# Patient Record
(demographics unavailable — no encounter records)

---

## 2025-02-03 NOTE — PHYSICAL EXAM
[No Acute Distress] : no acute distress [Well Nourished] : well nourished [Well Developed] : well developed [Well-Appearing] : well-appearing [Normal Sclera/Conjunctiva] : normal sclera/conjunctiva [PERRL] : pupils equal round and reactive to light [EOMI] : extraocular movements intact [Normal Outer Ear/Nose] : the outer ears and nose were normal in appearance [Normal Oropharynx] : the oropharynx was normal [No JVD] : no jugular venous distention [No Lymphadenopathy] : no lymphadenopathy [Thyroid Normal, No Nodules] : the thyroid was normal and there were no nodules present [Supple] : supple [No Respiratory Distress] : no respiratory distress  [No Accessory Muscle Use] : no accessory muscle use [Clear to Auscultation] : lungs were clear to auscultation bilaterally [Normal Rate] : normal rate  [Regular Rhythm] : with a regular rhythm [Normal S1, S2] : normal S1 and S2 [No Murmur] : no murmur heard [No Abdominal Bruit] : a ~M bruit was not heard ~T in the abdomen [No Carotid Bruits] : no carotid bruits [No Varicosities] : no varicosities [Pedal Pulses Present] : the pedal pulses are present [No Edema] : there was no peripheral edema [No Palpable Aorta] : no palpable aorta [No Extremity Clubbing/Cyanosis] : no extremity clubbing/cyanosis [Soft] : abdomen soft [Non Tender] : non-tender [No Masses] : no abdominal mass palpated [Non-distended] : non-distended [No HSM] : no HSM [Normal Bowel Sounds] : normal bowel sounds [Normal Posterior Cervical Nodes] : no posterior cervical lymphadenopathy [Normal Anterior Cervical Nodes] : no anterior cervical lymphadenopathy [No CVA Tenderness] : no CVA  tenderness [No Spinal Tenderness] : no spinal tenderness [No Joint Swelling] : no joint swelling [Grossly Normal Strength/Tone] : grossly normal strength/tone [No Rash] : no rash [Coordination Grossly Intact] : coordination grossly intact [No Focal Deficits] : no focal deficits [Normal Gait] : normal gait [Deep Tendon Reflexes (DTR)] : deep tendon reflexes were 2+ and symmetric [Normal Affect] : the affect was normal [Normal Insight/Judgement] : insight and judgment were intact

## 2025-02-26 NOTE — HISTORY OF PRESENT ILLNESS
[FreeTextEntry1] : URI [de-identified] : Patient presents today for further evaluation of cough and sore throat for the past 2 days. Patient reports mild fever. States their symptoms have been getting worse despite the use of OTC meds.  denies any CP, SOB or diff breathing. States symptoms worsen with exertion, no alleviating factors.  Has no other complaints at this time.

## 2025-02-26 NOTE — PLAN
[FreeTextEntry1] : Labs Drawn by Dr. Flaco Bhat due to poor venous access.  Patient required lab testing due to conditions in their past medical history requiring periodic monitoring.  Labs were sent to [facility].  Start Prednisone as directed Start Dayquil as directed Start children's liquid Benadryl at night time Start Motrin as directed every 4-6 hours as directed. Start Flonase as directed Patient to continue with present medications.   Increase fluid intake..   RTO PRN   At least 20 minutes was spent with patient face to face examining and reviewing findings/results during this visit.  Ample time was provided to answer any questions or address concerns to the patients satisfaction..  I, Sarah Olguin, attest that this documentation has been prepared under the direction and in the presence of Provider Flaco Bhat DNP  The documentation recorded by the scribe, in my presence, accurately reflects the service I personally performed, and the decisions made by me with my edits as appropriate. Flaco Bhat DNP

## 2025-02-26 NOTE — PHYSICAL EXAM
[No Acute Distress] : no acute distress [Well Nourished] : well nourished [Well Developed] : well developed [Well-Appearing] : well-appearing [Normal Sclera/Conjunctiva] : normal sclera/conjunctiva [PERRL] : pupils equal round and reactive to light [EOMI] : extraocular movements intact [Normal Outer Ear/Nose] : the outer ears and nose were normal in appearance [Normal Oropharynx] : the oropharynx was normal [No JVD] : no jugular venous distention [No Lymphadenopathy] : no lymphadenopathy [Supple] : supple [Thyroid Normal, No Nodules] : the thyroid was normal and there were no nodules present [No Respiratory Distress] : no respiratory distress  [No Accessory Muscle Use] : no accessory muscle use [Clear to Auscultation] : lungs were clear to auscultation bilaterally [Normal Rate] : normal rate  [Regular Rhythm] : with a regular rhythm [Normal S1, S2] : normal S1 and S2 [No Murmur] : no murmur heard [No Carotid Bruits] : no carotid bruits [No Abdominal Bruit] : a ~M bruit was not heard ~T in the abdomen [No Varicosities] : no varicosities [Pedal Pulses Present] : the pedal pulses are present [No Edema] : there was no peripheral edema [No Palpable Aorta] : no palpable aorta [No Extremity Clubbing/Cyanosis] : no extremity clubbing/cyanosis [Soft] : abdomen soft [Non Tender] : non-tender [Non-distended] : non-distended [No Masses] : no abdominal mass palpated [No HSM] : no HSM [Normal Bowel Sounds] : normal bowel sounds [Normal Posterior Cervical Nodes] : no posterior cervical lymphadenopathy [Normal Anterior Cervical Nodes] : no anterior cervical lymphadenopathy [No CVA Tenderness] : no CVA  tenderness [No Spinal Tenderness] : no spinal tenderness [No Joint Swelling] : no joint swelling [Grossly Normal Strength/Tone] : grossly normal strength/tone [No Rash] : no rash [Coordination Grossly Intact] : coordination grossly intact [No Focal Deficits] : no focal deficits [Normal Gait] : normal gait [Deep Tendon Reflexes (DTR)] : deep tendon reflexes were 2+ and symmetric [Normal Affect] : the affect was normal [Normal Insight/Judgement] : insight and judgment were intact [de-identified] : pressure behind RASHI TMs, PND, positive anterior/posterior cervical chain on palpation

## 2025-03-03 NOTE — PLAN
[FreeTextEntry1] : Infectious mononucleosis:  Patient seen last week with positive lab results showing recent infection.  Still having symptoms of sore throat with accompanied cough.  Occasional wheeze at night and some shortness of breath when laying down. Physical exam reassuring.  Lung sounds clear.  No significant oropharynx angioedema.  Patient moving air well freely without difficulty. Patient and patient's mother educated on diagnosis and viral pathology. Will treat the patient symptoms with Airsupra MDI to help relieve some of the wheezing at night and for the anti-inflammatory properties of the inhaled corticosteroid to reduce the coughing as well.  Proper instructions on use given in clinic.  Use as directed. Benzonatate 100 mg p.o. 3 times daily as needed for cough. Ibuprofen 600 mg p.o. Q6-8H as needed for pain and inflammation.  Counseled on the risk of GI bleeding renal injury prolonged use of NSAIDs. Highly encourage conservative treatment of symptoms:    use honey by itself or in warm water often for sore throat or cough. Gargle with warm salt water often as needed for sore throat. Use ice pops, hard candies, lozenges as needed. Stay hydrated drinking 3 L of water daily. Urine should be clear to pale yellow. Eat 3 well-balanced meals daily. Gets 7-9 hours of sleep nightly while performing good sleep hygiene. Wash hands often using soap and water for 20 seconds. wear a mask and cover cough until your symptoms have completely resolved. Encouraged use of Shah fire in the room at night. Patient educated on the diagnosis and provided reassurance.  Answered all of her questions. Patient given educational handout from up-to-date on infectious mononucleosis for review at home. Patient given a school note for excusal up until Wednesday 2 days from now. Reviewed signs symptoms warranting reevaluation in clinic or when to seek help in the urgent care/emergency room. Discussed and reviewed current medications. Patient to continue with present medications - all medications reconciled/reviewed during this visit and listed above.   At least 20 minutes was spent with patient face to face examining and reviewing findings/results during this visit. Ample time was provided to answer any questions or address concerns to the patients satisfaction.

## 2025-03-03 NOTE — PHYSICAL EXAM
[No Acute Distress] : no acute distress [Well Nourished] : well nourished [Well Developed] : well developed [Ill-Appearing] : ill-appearing [Normal Sclera/Conjunctiva] : normal sclera/conjunctiva [PERRL] : pupils equal round and reactive to light [EOMI] : extraocular movements intact [Normal Outer Ear/Nose] : the outer ears and nose were normal in appearance [Normal TMs] : both tympanic membranes were normal [No JVD] : no jugular venous distention [No Lymphadenopathy] : no lymphadenopathy [Supple] : supple [No Respiratory Distress] : no respiratory distress  [No Accessory Muscle Use] : no accessory muscle use [Clear to Auscultation] : lungs were clear to auscultation bilaterally [Normal Rate] : normal rate  [Regular Rhythm] : with a regular rhythm [Normal S1, S2] : normal S1 and S2 [No Edema] : there was no peripheral edema [No Extremity Clubbing/Cyanosis] : no extremity clubbing/cyanosis [Soft] : abdomen soft [Non Tender] : non-tender [Non-distended] : non-distended [No Masses] : no abdominal mass palpated [No HSM] : no HSM [Normal Bowel Sounds] : normal bowel sounds [Normal Posterior Cervical Nodes] : no posterior cervical lymphadenopathy [Normal Anterior Cervical Nodes] : no anterior cervical lymphadenopathy [No CVA Tenderness] : no CVA  tenderness [No Spinal Tenderness] : no spinal tenderness [No Rash] : no rash [Coordination Grossly Intact] : coordination grossly intact [Normal Gait] : normal gait [Normal Affect] : the affect was normal [Normal Insight/Judgement] : insight and judgment were intact [de-identified] : Mildly erythematous posterior oropharynx with scant postnasal drip.  +1 tonsillar enlargement without exudate.  Nasal mucosa dry and erythematous

## 2025-03-03 NOTE — REVIEW OF SYSTEMS
[Fatigue] : fatigue [Sore Throat] : sore throat [Shortness Of Breath] : shortness of breath [Wheezing] : wheezing [Cough] : cough [Negative] : Psychiatric [Fever] : no fever [Chills] : no chills [Hot Flashes] : no hot flashes [Night Sweats] : no night sweats [Recent Change In Weight] : ~T no recent weight change [Earache] : no earache [Hearing Loss] : no hearing loss [Nosebleed] : no nosebleeds [Hoarseness] : no hoarseness [Nasal Discharge] : no nasal discharge [Postnasal Drip] : no postnasal drip [Dyspnea on Exertion] : no dyspnea on exertion [FreeTextEntry4] : Dry nasal passages

## 2025-03-03 NOTE — HISTORY OF PRESENT ILLNESS
[FreeTextEntry8] : Twyla, 21 y/o female presents with worsening cough and persistent sore throat following a course of prednisone.  Patient was seen last week by her primary care manager and placed on prednisone.  Labs were drawn.  She has been experiencing symptoms consistent with mononucleosis for approximately one week. The patient reports a severe sore throat characterized by tightness, swelling, and difficulty swallowing. She also has a post-inflammatory cough that is interfering with her sleep. Twyla has not had a fever for the past two days and denies wheezing or shortness of breath except when laying down at night. For symptom management, she has been using Robitussin and Benadryl. Due to her illness, she is currently not attending school. Recent laboratory tests have confirmed a diagnosis of mononucleosis, showing elevated IgG and a positive nuclear antigen. The patient has been taking Tylenol and previously used Ibuprofen, though she has not taken the latter in the last two days. Twyla has no known drug allergies. She is a student and likely lives with her family. Additionally, she reports fatigue as part of her current symptoms.